# Patient Record
(demographics unavailable — no encounter records)

---

## 2025-05-09 NOTE — REVIEW OF SYSTEMS
[Anxiety] : anxiety [Depression] : depression [Negative] : Heme/Lymph [Joint Pain] : joint pain [Joint Stiffness] : joint stiffness [Joint Swelling] : joint swelling [Suicidal] : not suicidal [Insomnia] : no insomnia

## 2025-05-09 NOTE — PHYSICAL EXAM
[No Acute Distress] : no acute distress [Normal Sclera/Conjunctiva] : normal sclera/conjunctiva [PERRL] : pupils equal round and reactive to light [EOMI] : extraocular movements intact [Normal Outer Ear/Nose] : the outer ears and nose were normal in appearance [Normal Oropharynx] : the oropharynx was normal [No JVD] : no jugular venous distention [No Lymphadenopathy] : no lymphadenopathy [Supple] : supple [Thyroid Normal, No Nodules] : the thyroid was normal and there were no nodules present [No Respiratory Distress] : no respiratory distress  [No Accessory Muscle Use] : no accessory muscle use [Clear to Auscultation] : lungs were clear to auscultation bilaterally [Normal Rate] : normal rate  [Regular Rhythm] : with a regular rhythm [Normal S1, S2] : normal S1 and S2 [No Murmur] : no murmur heard [No Varicosities] : no varicosities [Pedal Pulses Present] : the pedal pulses are present [No Edema] : there was no peripheral edema [No Palpable Aorta] : no palpable aorta [No Extremity Clubbing/Cyanosis] : no extremity clubbing/cyanosis [Soft] : abdomen soft [Non Tender] : non-tender [Non-distended] : non-distended [No Masses] : no abdominal mass palpated [No HSM] : no HSM [Normal Bowel Sounds] : normal bowel sounds [Normal Anterior Cervical Nodes] : no anterior cervical lymphadenopathy [No CVA Tenderness] : no CVA  tenderness [No Spinal Tenderness] : no spinal tenderness [No Joint Swelling] : no joint swelling [Grossly Normal Strength/Tone] : grossly normal strength/tone [No Rash] : no rash [Coordination Grossly Intact] : coordination grossly intact [No Focal Deficits] : no focal deficits [Normal Gait] : normal gait [Deep Tendon Reflexes (DTR)] : deep tendon reflexes were 2+ and symmetric [de-identified] : Left knee: swelling, no erythematous and tenderness [de-identified] : Anxious looking, good eye to eye contact and engaged conversation well.

## 2025-05-09 NOTE — HEALTH RISK ASSESSMENT
[Good] : ~his/her~  mood as  good [No] : In the past 12 months have you used drugs other than those required for medical reasons? No [Several Days (1)] : 9.) Thoughts that you would be off dead or of hurting yourself in some way? Several days [PHQ-9 Positive] : PHQ-9 Positive [I have developed a follow-up plan documented below in the note.] : I have developed a follow-up plan documented below in the note. [Yes] : Reviewed medication list for presence of high-risk medications. [Opioids] : opioids [Never] : Never [Patient reported mammogram was normal] : Patient reported mammogram was normal [Patient reported PAP Smear was normal] : Patient reported PAP Smear was normal [Patient reported colonoscopy was normal] : Patient reported colonoscopy was normal [[id number]] : 9 [MammogramDate] : 10/23 [PapSmearDate] : 8/24 [ColonoscopyDate] : 2020

## 2025-05-09 NOTE — HEALTH RISK ASSESSMENT
[Good] : ~his/her~  mood as  good [No] : In the past 12 months have you used drugs other than those required for medical reasons? No [Several Days (1)] : 9.) Thoughts that you would be off dead or of hurting yourself in some way? Several days [PHQ-9 Positive] : PHQ-9 Positive [I have developed a follow-up plan documented below in the note.] : I have developed a follow-up plan documented below in the note. [Yes] : Reviewed medication list for presence of high-risk medications. [Opioids] : opioids [Never] : Never [Patient reported mammogram was normal] : Patient reported mammogram was normal [Patient reported PAP Smear was normal] : Patient reported PAP Smear was normal [Patient reported colonoscopy was normal] : Patient reported colonoscopy was normal [[id number]] : 9 [MammogramDate] : 10/23 [PapSmearDate] : 8/24 [ColonoscopyDate] : 2020

## 2025-05-09 NOTE — PHYSICAL EXAM
[No Acute Distress] : no acute distress [Normal Sclera/Conjunctiva] : normal sclera/conjunctiva [PERRL] : pupils equal round and reactive to light [EOMI] : extraocular movements intact [Normal Outer Ear/Nose] : the outer ears and nose were normal in appearance [Normal Oropharynx] : the oropharynx was normal [No JVD] : no jugular venous distention [No Lymphadenopathy] : no lymphadenopathy [Supple] : supple [Thyroid Normal, No Nodules] : the thyroid was normal and there were no nodules present [No Respiratory Distress] : no respiratory distress  [No Accessory Muscle Use] : no accessory muscle use [Clear to Auscultation] : lungs were clear to auscultation bilaterally [Normal Rate] : normal rate  [Regular Rhythm] : with a regular rhythm [Normal S1, S2] : normal S1 and S2 [No Murmur] : no murmur heard [No Varicosities] : no varicosities [Pedal Pulses Present] : the pedal pulses are present [No Edema] : there was no peripheral edema [No Palpable Aorta] : no palpable aorta [No Extremity Clubbing/Cyanosis] : no extremity clubbing/cyanosis [Soft] : abdomen soft [Non Tender] : non-tender [Non-distended] : non-distended [No Masses] : no abdominal mass palpated [No HSM] : no HSM [Normal Bowel Sounds] : normal bowel sounds [Normal Anterior Cervical Nodes] : no anterior cervical lymphadenopathy [No CVA Tenderness] : no CVA  tenderness [No Spinal Tenderness] : no spinal tenderness [No Joint Swelling] : no joint swelling [Grossly Normal Strength/Tone] : grossly normal strength/tone [No Rash] : no rash [Coordination Grossly Intact] : coordination grossly intact [No Focal Deficits] : no focal deficits [Normal Gait] : normal gait [Deep Tendon Reflexes (DTR)] : deep tendon reflexes were 2+ and symmetric [de-identified] : Left knee: swelling, no erythematous and tenderness [de-identified] : Anxious looking, good eye to eye contact and engaged conversation well.

## 2025-05-09 NOTE — HISTORY OF PRESENT ILLNESS
[de-identified] : 53 years old female with past medical history of hypertension, hyperlipidemia, impaired fasting glucose, iron deficiency anemia, uterine fibroid, vitamin D deficiency, anxiety and depression came back for annual physical exam. 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 Pt has been having left knee pain, s/p serial of steroid injection, no improvement. Pt just had another MRI of left knee in 2 weeks ago, will follow up with ortho next week.  Pt stated that she has no regular period for almost a year, occasional pinkish staining. But recently pt has been getting vaginal bleeding on and off lasting 1-2 weeks. No abdominal pain.  Pt complained hair growth at chin area.  Pt also requested STD check.

## 2025-05-09 NOTE — HEALTH RISK ASSESSMENT
[Good] : ~his/her~  mood as  good [No] : In the past 12 months have you used drugs other than those required for medical reasons? No [Several Days (1)] : 9.) Thoughts that you would be off dead or of hurting yourself in some way? Several days [PHQ-9 Positive] : PHQ-9 Positive [I have developed a follow-up plan documented below in the note.] : I have developed a follow-up plan documented below in the note. [Yes] : Reviewed medication list for presence of high-risk medications. [Opioids] : opioids [Never] : Never [Patient reported mammogram was normal] : Patient reported mammogram was normal [Patient reported PAP Smear was normal] : Patient reported PAP Smear was normal [Patient reported colonoscopy was normal] : Patient reported colonoscopy was normal [[id number]] : 9 [MammogramDate] : 10/23 [PapSmearDate] : 8/24 [ColonoscopyDate] : 2020

## 2025-05-09 NOTE — HISTORY OF PRESENT ILLNESS
[de-identified] : 53 years old female with past medical history of hypertension, hyperlipidemia, impaired fasting glucose, iron deficiency anemia, uterine fibroid, vitamin D deficiency, anxiety and depression came back for annual physical exam. 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 Pt has been having left knee pain, s/p serial of steroid injection, no improvement. Pt just had another MRI of left knee in 2 weeks ago, will follow up with ortho next week.  Pt stated that she has no regular period for almost a year, occasional pinkish staining. But recently pt has been getting vaginal bleeding on and off lasting 1-2 weeks. No abdominal pain.  Pt complained hair growth at chin area.  Pt also requested STD check.

## 2025-05-09 NOTE — HISTORY OF PRESENT ILLNESS
[de-identified] : 53 years old female with past medical history of hypertension, hyperlipidemia, impaired fasting glucose, iron deficiency anemia, uterine fibroid, vitamin D deficiency, anxiety and depression came back for annual physical exam. 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 Pt has been having left knee pain, s/p serial of steroid injection, no improvement. Pt just had another MRI of left knee in 2 weeks ago, will follow up with ortho next week.  Pt stated that she has no regular period for almost a year, occasional pinkish staining. But recently pt has been getting vaginal bleeding on and off lasting 1-2 weeks. No abdominal pain.  Pt complained hair growth at chin area.  Pt also requested STD check.

## 2025-05-21 NOTE — HISTORY OF PRESENT ILLNESS
[Home] : at home, [unfilled] , at the time of the visit. [Medical Office: ([facility])___] : at the medical office located in  [Telephone (audio)] : This telephonic visit was provided via audio only technology. [Verbal consent obtained from patient] : the patient, [unfilled] [de-identified] : 53 years old female with past medical history of hypertension, hyperlipidemia, impaired fasting glucose, iron deficiency anemia, uterine fibroid, vitamin D deficiency, anxiety and depression called back to review her most recent test results.  Total cholesterol was 249, , HBSAB nonreactive, herpes simplex 1 and 2 IG G AB positive. Reports were reviewed with pt in details. Other blood tests came back wnl.

## 2025-05-21 NOTE — HISTORY OF PRESENT ILLNESS
[Home] : at home, [unfilled] , at the time of the visit. [Medical Office: ([facility])___] : at the medical office located in  [Telephone (audio)] : This telephonic visit was provided via audio only technology. [Verbal consent obtained from patient] : the patient, [unfilled] [de-identified] : 53 years old female with past medical history of hypertension, hyperlipidemia, impaired fasting glucose, iron deficiency anemia, uterine fibroid, vitamin D deficiency, anxiety and depression called back to review her most recent test results.  Total cholesterol was 249, , HBSAB nonreactive, herpes simplex 1 and 2 IG G AB positive. Reports were reviewed with pt in details. Other blood tests came back wnl.